# Patient Record
(demographics unavailable — no encounter records)

---

## 2024-10-21 NOTE — HISTORY OF PRESENT ILLNESS
[FreeTextEntry1] : Patient is a 22-year-old  0 last menstrual 2024 Patient has diagnosis of CP and is using a wheelchair and due to slight body constrictions pelvic exam was not adequate during her last visit Patient presents today for pelvic sonogram to fully assess the pelvic anatomy

## 2024-10-21 NOTE — PLAN
[FreeTextEntry1] : Patient is a 22-year-old  0 last menstrual period 2024 Patient presents for pelvic sonogram to further evaluate the pelvic anatomy Patient has diagnosis of CP and is using a wheelchair and because of the slight constriction of her anatomy pelvic exam is inadequate Pelvic sonogram Uterus 5.49 x 4.23 x 2.58 Cervical length 2.55 cm Endometrial thickness 2.4 mm Right ovary 3.0 x 3.01 x 1.20 Left ovary 2.33 x 2.39 x 0.91 Both ovaries normal color Doppler flow No adnexal masses Small amount of free fluid noted Results discussed with patient Reassured Follow-up 1 year to return for annual exam  Patient was seen in the office consultation for discussion of the findings and not examined during this visit    Results

## 2025-03-10 NOTE — PHYSICAL EXAM
[No Lymphadenopathy] : no lymphadenopathy [Supple] : supple [Normal] : normal rate, regular rhythm, normal S1 and S2 and no murmur heard [No Edema] : there was no peripheral edema [Soft] : abdomen soft [Non Tender] : non-tender [Non-distended] : non-distended [Normal Bowel Sounds] : normal bowel sounds [No Rash] : no rash [Normal Gait] : normal gait [Normal Mood] : the mood was normal [de-identified] : friendly [de-identified] : in wheelchair

## 2025-03-10 NOTE — PHYSICAL EXAM
[No Lymphadenopathy] : no lymphadenopathy [Supple] : supple [Normal] : normal rate, regular rhythm, normal S1 and S2 and no murmur heard [No Edema] : there was no peripheral edema [Soft] : abdomen soft [Non Tender] : non-tender [Non-distended] : non-distended [Normal Bowel Sounds] : normal bowel sounds [No Rash] : no rash [Normal Gait] : normal gait [Normal Mood] : the mood was normal [de-identified] : friendly [de-identified] : in wheelchair

## 2025-03-10 NOTE — PLAN
[FreeTextEntry1] : CDPAP form completed and given to patient. Chronic back pain - Mobic renewed. Coupon provided.  RTO 3 months for physical.

## 2025-03-10 NOTE — HISTORY OF PRESENT ILLNESS
[Spouse] : spouse [Other: _____] : [unfilled] [Friend] : friend [FreeTextEntry1] : Forms [de-identified] : Pt comes for follow up visit.  She needs form for personal care / consumer directed personal assisted services (CDPAP) completed.  She feels well today, has no complaints. Has chronic back pain, needs Mobic renewed. She is having issues with her Medicaid plan and getting her medications renewed. 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

## 2025-03-10 NOTE — HISTORY OF PRESENT ILLNESS
[Spouse] : spouse [Other: _____] : [unfilled] [Friend] : friend [FreeTextEntry1] : Forms [de-identified] : Pt comes for follow up visit.  She needs form for personal care / consumer directed personal assisted services (CDPAP) completed.  She feels well today, has no complaints. Has chronic back pain, needs Mobic renewed. She is having issues with her Medicaid plan and getting her medications renewed. 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

## 2025-07-30 NOTE — PLAN
[FreeTextEntry1] : Check routine fasting labs. Follow up with GYN in 10/24 for annual and PAP. She is not sexually active. Vaccines discussed. Chronic back pain - on Mobic daily. She stopped seeing pain management. Check vitamin D level. BP / HR always elevated in office due to anxiety.  Insomnia - renewed Hydroxyzine.  RTO in 1 yr for annual exam or earlier PRN for acute care.

## 2025-07-30 NOTE — HISTORY OF PRESENT ILLNESS
[Family Member] : family member [FreeTextEntry1] : physical [de-identified] : Patient comes for an annual exam.  She reports feeling well. 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

## 2025-07-30 NOTE — PHYSICAL EXAM
[No Lymphadenopathy] : no lymphadenopathy [Supple] : supple [Normal] : normal rate, regular rhythm, normal S1 and S2 and no murmur heard [No Edema] : there was no peripheral edema [Soft] : abdomen soft [Non Tender] : non-tender [Non-distended] : non-distended [Normal Bowel Sounds] : normal bowel sounds [No Rash] : no rash [Normal Gait] : normal gait [Normal Mood] : the mood was normal [de-identified] : friendly [de-identified] : b/l dagoberton R>L [de-identified] : red marks on face / arms (anxiety) [de-identified] : in wheelchair

## 2025-07-30 NOTE — HEALTH RISK ASSESSMENT
[Good] : ~his/her~  mood as  good [No] : In the past 12 months have you used drugs other than those required for medical reasons? No [No falls in past year] : Patient reported no falls in the past year [Little interest or pleasure doing things] : 1) Little interest or pleasure doing things [Feeling down, depressed, or hopeless] : 2) Feeling down, depressed, or hopeless [0] : 2) Feeling down, depressed, or hopeless: Not at all (0) [PHQ-2 Negative - No further assessment needed] : PHQ-2 Negative - No further assessment needed [Never] : Never [Patient reported PAP Smear was normal] : Patient reported PAP Smear was normal [None] : None [With Family] : lives with family [Student] : student [Single] : single [# Of Children ___] : has [unfilled] children [Smoke Detector] : smoke detector [Carbon Monoxide Detector] : carbon monoxide detector [Seat Belt] :  uses seat belt [Sunscreen] : uses sunscreen [[id number]] : 0 [Sexually Active] : not sexually active [High Risk Behavior] : no high risk behavior [Reports changes in hearing] : Reports no changes in hearing [Reports changes in vision] : Reports no changes in vision [Reports changes in dental health] : Reports no changes in dental health [PapSmearDate] : 10/24